# Patient Record
Sex: FEMALE | Race: ASIAN
[De-identification: names, ages, dates, MRNs, and addresses within clinical notes are randomized per-mention and may not be internally consistent; named-entity substitution may affect disease eponyms.]

---

## 2017-04-04 ENCOUNTER — HOSPITAL ENCOUNTER (OUTPATIENT)
Dept: HOSPITAL 45 - C.PAPS | Age: 52
Discharge: HOME | End: 2017-04-04
Attending: OBSTETRICS & GYNECOLOGY
Payer: COMMERCIAL

## 2017-04-04 DIAGNOSIS — Z01.419: Primary | ICD-10-CM

## 2017-06-19 ENCOUNTER — HOSPITAL ENCOUNTER (OUTPATIENT)
Dept: HOSPITAL 45 - C.LAB1850 | Age: 52
Discharge: HOME | End: 2017-06-19
Attending: INTERNAL MEDICINE
Payer: COMMERCIAL

## 2017-06-19 DIAGNOSIS — R61: ICD-10-CM

## 2017-06-19 DIAGNOSIS — Z11.59: ICD-10-CM

## 2017-06-19 DIAGNOSIS — Z13.220: ICD-10-CM

## 2017-06-19 DIAGNOSIS — L98.9: ICD-10-CM

## 2017-06-19 DIAGNOSIS — Z00.00: Primary | ICD-10-CM

## 2017-06-19 LAB
ALBUMIN/GLOB SERPL: 1 {RATIO} (ref 0.9–2)
ALP SERPL-CCNC: 56 U/L (ref 45–117)
ALT SERPL-CCNC: 25 U/L (ref 12–78)
ANION GAP SERPL CALC-SCNC: 8 MMOL/L (ref 3–11)
AST SERPL-CCNC: 20 U/L (ref 15–37)
BASOPHILS # BLD: 0.02 K/UL (ref 0–0.2)
BASOPHILS NFR BLD: 0.3 %
BUN SERPL-MCNC: 8 MG/DL (ref 7–18)
BUN/CREAT SERPL: 12.1 (ref 10–20)
CALCIUM SERPL-MCNC: 9.3 MG/DL (ref 8.5–10.1)
CHLORIDE SERPL-SCNC: 105 MMOL/L (ref 98–107)
CHOLEST/HDLC SERPL: 3.4 {RATIO}
CO2 SERPL-SCNC: 28 MMOL/L (ref 21–32)
COMPLETE: YES
CREAT SERPL-MCNC: 0.66 MG/DL (ref 0.6–1.2)
EOSINOPHIL NFR BLD AUTO: 363 K/UL (ref 130–400)
GLOBULIN SER-MCNC: 4.2 GM/DL (ref 2.5–4)
GLUCOSE SERPL-MCNC: 89 MG/DL (ref 70–99)
GLUCOSE UR QL: 58 MG/DL
HCT VFR BLD CALC: 38.9 % (ref 37–47)
IG%: 0.2 %
IMM GRANULOCYTES NFR BLD AUTO: 43.2 %
KETONES UR QL STRIP: 105 MG/DL
LYMPHOCYTES # BLD: 2.49 K/UL (ref 1.2–3.4)
MCH RBC QN AUTO: 31.9 PG (ref 25–34)
MCHC RBC AUTO-ENTMCNC: 33.2 G/DL (ref 32–36)
MCV RBC AUTO: 96.3 FL (ref 80–100)
MONOCYTES NFR BLD: 9.4 %
NEUTROPHILS # BLD AUTO: 4.2 %
NEUTROPHILS NFR BLD AUTO: 42.7 %
NITRITE UR QL STRIP: 168 MG/DL (ref 0–150)
PH UR: 197 MG/DL (ref 0–200)
PMV BLD AUTO: 9.5 FL (ref 7.4–10.4)
POTASSIUM SERPL-SCNC: 4 MMOL/L (ref 3.5–5.1)
RBC # BLD AUTO: 4.04 M/UL (ref 4.2–5.4)
SODIUM SERPL-SCNC: 141 MMOL/L (ref 136–145)
TSH SERPL-ACNC: 1.38 UIU/ML (ref 0.3–4.5)
VERY LOW DENSITY LIPOPROT CALC: 34 MG/DL
WBC # BLD AUTO: 5.77 K/UL (ref 4.8–10.8)

## 2017-08-10 ENCOUNTER — HOSPITAL ENCOUNTER (OUTPATIENT)
Dept: HOSPITAL 45 - C.MAMM | Age: 52
Discharge: HOME | End: 2017-08-10
Attending: FAMILY MEDICINE
Payer: COMMERCIAL

## 2017-08-10 DIAGNOSIS — Z12.31: Primary | ICD-10-CM

## 2017-08-11 NOTE — MAMMOGRAPHY REPORT
BILATERAL DIGITAL SCREENING MAMMOGRAM TOMOSYNTHESIS WITH CAD: 8/10/2017

CLINICAL HISTORY: Routine screening.  Patient has no complaints.  





TECHNIQUE:  Breast tomosynthesis in addition to standard 2D mammography was performed. Current study 
was also evaluated with a Computer Aided Detection (CAD) system.  



COMPARISON: Comparison is made to exams dated:  8/9/2016 mammogram, 5/13/2014 mammogram, and 4/23/201
3 mammogram - [facility].   



BREAST COMPOSITION:  The tissue of both breasts is extremely dense, which lowers the sensitivity of m
ammography.  



FINDINGS:  No suspicious masses, calcifications, or areas of architectural distortion are noted in ei
ther breast. There has been no significant interval change compared to prior exams.  



IMPRESSION:  ACR BI-RADS CATEGORY 1: NEGATIVE

There is no mammographic evidence of malignancy. A 1 year screening mammogram is recommended.  The pa
tient will receive written notification of the results.  





Approximately 10% of breast cancers are not detected with mammography. A negative mammographic report
 should not delay biopsy if a clinically suggestive mass is present.



Sparkle Larson M.D.          

ah/:8/10/2017 15:42:21  



Imaging Technologist: Shantel GOLD(R)(M), [facility]

letter sent: Normal 1/2  

BI-RADS Code: ACR BI-RADS Category 1: Negative

## 2018-04-11 ENCOUNTER — HOSPITAL ENCOUNTER (EMERGENCY)
Dept: HOSPITAL 45 - C.EDB | Age: 53
Discharge: HOME | End: 2018-04-11
Payer: COMMERCIAL

## 2018-04-11 VITALS
HEIGHT: 62.99 IN | BODY MASS INDEX: 21.48 KG/M2 | HEIGHT: 62.99 IN | WEIGHT: 121.25 LBS | WEIGHT: 121.25 LBS | BODY MASS INDEX: 21.48 KG/M2

## 2018-04-11 VITALS — TEMPERATURE: 97.88 F

## 2018-04-11 VITALS — DIASTOLIC BLOOD PRESSURE: 59 MMHG | HEART RATE: 62 BPM | SYSTOLIC BLOOD PRESSURE: 117 MMHG | OXYGEN SATURATION: 98 %

## 2018-04-11 DIAGNOSIS — R22.0: Primary | ICD-10-CM

## 2018-04-11 DIAGNOSIS — L53.9: ICD-10-CM

## 2018-04-11 LAB
ALBUMIN SERPL-MCNC: 3.8 GM/DL (ref 3.4–5)
ALP SERPL-CCNC: 62 U/L (ref 45–117)
ALT SERPL-CCNC: 22 U/L (ref 12–78)
AST SERPL-CCNC: 19 U/L (ref 15–37)
BASOPHILS # BLD: 0.04 K/UL (ref 0–0.2)
BASOPHILS NFR BLD: 0.7 %
BUN SERPL-MCNC: 9 MG/DL (ref 7–18)
CALCIUM SERPL-MCNC: 9 MG/DL (ref 8.5–10.1)
CO2 SERPL-SCNC: 29 MMOL/L (ref 21–32)
CREAT SERPL-MCNC: 0.6 MG/DL (ref 0.6–1.2)
EOS ABS #: 0.18 K/UL (ref 0–0.5)
EOSINOPHIL NFR BLD AUTO: 325 K/UL (ref 130–400)
GLUCOSE SERPL-MCNC: 89 MG/DL (ref 70–99)
HCT VFR BLD CALC: 37 % (ref 37–47)
HGB BLD-MCNC: 12.6 G/DL (ref 12–16)
IG#: 0.01 K/UL (ref 0–0.02)
IMM GRANULOCYTES NFR BLD AUTO: 39.9 %
LYMPHOCYTES # BLD: 2.32 K/UL (ref 1.2–3.4)
MCH RBC QN AUTO: 32.4 PG (ref 25–34)
MCHC RBC AUTO-ENTMCNC: 34.1 G/DL (ref 32–36)
MCV RBC AUTO: 95.1 FL (ref 80–100)
MONO ABS #: 0.47 K/UL (ref 0.11–0.59)
MONOCYTES NFR BLD: 8.1 %
NEUT ABS #: 2.79 K/UL (ref 1.4–6.5)
NEUTROPHILS # BLD AUTO: 3.1 %
NEUTROPHILS NFR BLD AUTO: 48 %
PMV BLD AUTO: 8.7 FL (ref 7.4–10.4)
POTASSIUM SERPL-SCNC: 3.5 MMOL/L (ref 3.5–5.1)
PROT SERPL-MCNC: 7.8 GM/DL (ref 6.4–8.2)
RED CELL DISTRIBUTION WIDTH CV: 13.5 % (ref 11.5–14.5)
RED CELL DISTRIBUTION WIDTH SD: 47 FL (ref 36.4–46.3)
SODIUM SERPL-SCNC: 140 MMOL/L (ref 136–145)
WBC # BLD AUTO: 5.81 K/UL (ref 4.8–10.8)

## 2018-04-11 NOTE — DIAGNOSTIC IMAGING REPORT
TWO VIEW CHEST



CLINICAL HISTORY: Dyspnea.



FINDINGS: PA and lateral chest radiographs are obtained. No prior studies are

available for comparison at the time of dictation.  The cardiomediastinal

silhouette is unremarkable.  The lungs and pleural spaces are clear. There is no

pneumothorax. The bony thorax appears intact.



IMPRESSION: No active disease in the chest.







Electronically signed by:  Yony Verma M.D.

4/11/2018 12:39 PM



Dictated Date/Time:  4/11/2018 12:38 PM

## 2018-04-12 NOTE — EMERGENCY ROOM VISIT NOTE
ED Visit Note


First contact with patient:  12:04


Chief Complaint: Facial swelling and difficulty breathing.





History of Present Illness: Ms. Edge is a 52-year-old  female who ambulates 

into the ED accompanied by her  complaining of facial redness, facial 

swelling and difficulty breathing.


Patient reports her symptoms started last evening approximately 14-15 hours 

ago.  She reports she initially noted some redness of her face and then 

swelling of her face.  She reports initially this was mild but has gradually 

increased.  And then through the night she reports she was experiencing 

difficulty breathing.  When I asked her to differentiate between shortness of 

breath and difficulty breathing she reported that it was difficulty breathing 

in that throughout the night she felt like her breathing was "heavy".  She has 

not identified any aggravating or alleviating factors related to these 

symptoms.  She has not taken any medications for these symptoms prior to 

arrival at the hospital.  She denies previous similar symptoms.


I did try to seek a cause and the only thing the patient remembers is that she 

used a face cream she had not used before a couple days ago.


Associated with her symptoms she does report that her face is itchy and that 

she had a mild intermittent nonproductive cough.


She denies fevers, chills, sweats, other skin eruptions, other skin color 

changes, other upper respiratory tract symptoms; nasal congestion, sinus 

congestion, lip swelling, tongue swelling, difficulty swallowing, voice changes

, drooling, painful talking, facial pain, chest pain, nausea/vomiting, 

decreased appetite.





Review of Systems: As noted above in history of present illness.  All body 

systems were reviewed and found to be negative as noted above.





Past Medical History: Patient denies.


Current Medications: Multivitamin.


Allergies to Medications: Patient denies.


Social History: Patient is currently employed; she lives with her  and 

feels safe in her home environment; she denies tobacco and alcohol use.





Physical Examination:


Vital Signs: 








  Date Time  Temp Pulse Resp B/P (MAP) Pulse Ox O2 Delivery O2 Flow Rate FiO2


 


4/11/18 14:37  62 14 117/59 98   


 


4/11/18 13:33  67 15  97 Room Air  


 


4/11/18 12:46  67      


 


4/11/18 11:46 36.6 68 16 147/88 96   





GENERAL: 52-year-old female in mild distress due to symptoms, nontoxic-appearing

, afebrile and hemodynamically stable.


NEUROLOGICAL: Awake, alert and oriented to person, place and time.  Answering 

questions appropriately and following commands.  Normal gait.  Good hand eye 

coordination.  No focal motor or sensory deficits.


SKIN: Warm, dry and pink.  Face: Patient had moderate erythema throughout the 

face associated with mild edema with prominence of the edema around the 

inferior eyes and nose bilaterally.  This area was warm to the touch but not 

hot.  It did not appear cellulitic.  There is no local lymphangitis.  The skin 

was not tender to palpation.


HEENT:  Atraumatic and normocephalic.  PERRLA.  Sclera white and conjunctiva 

pink.  No drainage from naris.  Oral cavity moist and pink.  Airway was patent.

  No lip or tongue swelling.  Pharynx is nonerythematous or edematous.  Speech 

normal and clear.  No lymphadenopathy.  Trachea midline.  No jugular venous 

distention.  No auditory or ausculatory stridor.


THORAX:  Lungs sounds are clear to auscultation but air movement was decreased 

bilaterally.  Equal bilaterally with symmetrical chest wall.  No wheezing, 

rales or rhonchi.  No crepitus, tenderness, subcutaneous air or deformities 

noted.  No increased respiratory effort or rate.


HEART:  Regular rate and rhythm.  No gallops, rubs or murmurs are appreciated.


ABDOMEN: Flat, soft and nontender.  Positive bowel sounds in all quadrants.  No 

guarding, rigidity or organomegaly.


EXTREMITIES:  Moves all extremities well on command and with purpose.  All 

distal neurovascular statuses are intact and equal bilaterally. 





ED Course:


Patient is assessed as noted above.


Patient's medication list was reviewed.


Laboratory Testing:








Test


  4/11/18


12:50 Range/Units


 


 


White Blood Count 5.81 4.8-10.8  K/uL


 


Red Blood Count 3.89 4.2-5.4  M/uL


 


Hemoglobin 12.6 12.0-16.0  g/dL


 


Hematocrit 37.0 37-47  %


 


Mean Corpuscular Volume 95.1   fL


 


Mean Corpuscular Hemoglobin 32.4 25-34  pg


 


Mean Corpuscular Hemoglobin


Concent 34.1


  32-36  g/dl


 


 


Platelet Count 325 130-400  K/uL


 


Mean Platelet Volume 8.7 7.4-10.4  fL


 


Neutrophils (%) (Auto) 48.0  %


 


Lymphocytes (%) (Auto) 39.9  %


 


Monocytes (%) (Auto) 8.1  %


 


Eosinophils (%) (Auto) 3.1  %


 


Basophils (%) (Auto) 0.7  %


 


Neutrophils # (Auto) 2.79 1.4-6.5  K/uL


 


Lymphocytes # (Auto) 2.32 1.2-3.4  K/uL


 


Monocytes # (Auto) 0.47 0.11-0.59  K/uL


 


Eosinophils # (Auto) 0.18 0-0.5  K/uL


 


Basophils # (Auto) 0.04 0-0.2  K/uL


 


RDW Standard Deviation 47.0 36.4-46.3  fL


 


RDW Coefficient of Variation 13.5 11.5-14.5  %


 


Immature Granulocyte % (Auto) 0.2  %


 


Immature Granulocyte # (Auto) 0.01 0.00-0.02  K/uL


 


Sodium Level 140 136-145  mmol/L


 


Potassium Level 3.5 3.5-5.1  mmol/L


 


Chloride Level 106   mmol/L


 


Carbon Dioxide Level 29 21-32  mmol/L


 


Anion Gap 6.0 3-11  mmol/L


 


Blood Urea Nitrogen 9 7-18  mg/dl


 


Creatinine


  0.60


  0.60-1.20


mg/dl


 


Est Creatinine Clear Calc


Drug Dose 88.6


   ml/min


 


 


Estimated GFR (


American) 119.8


   


 


 


Estimated GFR (Non-


American 103.3


   


 


 


BUN/Creatinine Ratio 14.9 10-20  


 


Random Glucose 89 70-99  mg/dl


 


Calcium Level 9.0 8.5-10.1  mg/dl


 


Total Bilirubin 0.9 0.2-1  mg/dl


 


Aspartate Amino Transf


(AST/SGOT) 19


  15-37  U/L


 


 


Alanine Aminotransferase


(ALT/SGPT) 22


  12-78  U/L


 


 


Alkaline Phosphatase 62   U/L


 


Total Protein 7.8 6.4-8.2  gm/dl


 


Albumin 3.8 3.4-5.0  gm/dl


 


Globulin 4.0 2.5-4.0  gm/dl


 


Albumin/Globulin Ratio 1.0 0.9-2  





Chest X-Ray: Was read by myself and the radiologist showing no acute infiltrates

, effusions or pneumothorax.  Normal heart silhouette and bony anatomy.


An IV lock was initiated and patient received 125 mg of Solu-Medrol IV and she 

received an albuterol/Atrovent nebulizer breathing treatment.


Patient was reassessed multiple times during her stay in the emergency 

department.  After her albuterol/Atrovent nebulizer breathing treatment her 

lungs were reevaluated and remained clear to auscultation but there was also 

increased air movement through all fields.  Additionally I did note on 

reassessments there was a significant decrease in her facial erythema and 

minimally decrease in the facial edema.


Patient's case was reviewed with Dr. Calderon; we agreed on diagnostic approach, 

treatment, disposition and plan.





Clinical Impression: Facial swelling and erythema.  Questionable allergic 

reaction.





Decision-Making: Initially my differential diagnosis I considered allergic 

reaction, dental abscess, sinus infection, angioedema, cellulitis and other 

causes.





Disposition: Patient discharged home in stable condition accompanied by her 

; prior to departure she was reassessed and subjectively reported she 

was feeling much better but still expressed concerns of her facial swelling.





Plan:


Patient was encouraged to use 25 mg of Benadryl every 6 hours as needed for 

facial itching.


Patient was prescribed a prednisone taper and instructed on its use.


Patient was encouraged to avoid the facial cream she was using.


Patient was encouraged to use ice on the face for swelling.


Patient was encouraged to follow-up with family physician for recheck in 2-3 

days if not completely better.


Patient was encouraged return the ED for worsening facial redness, worsening 

facial swelling, shortness of breath, sensations of lip/tongue swelling or any 

new/concerning symptoms.

## 2018-08-14 ENCOUNTER — HOSPITAL ENCOUNTER (OUTPATIENT)
Dept: HOSPITAL 45 - C.MAMM | Age: 53
Discharge: HOME | End: 2018-08-14
Attending: FAMILY MEDICINE
Payer: COMMERCIAL

## 2018-08-14 DIAGNOSIS — Z12.31: Primary | ICD-10-CM

## 2018-08-14 NOTE — MAMMOGRAPHY REPORT
BILATERAL DIGITAL SCREENING MAMMOGRAM TOMOSYNTHESIS WITH CAD: 8/14/2018

CLINICAL HISTORY: Routine screening.  





TECHNIQUE: The study was acquired using full field digital technology and interpreted from soft copy.
 Breast tomosynthesis in addition to standard 2D mammography was performed. Current study was also ev
aluated with a Computer Aided Detection (CAD) system.  



COMPARISON: Comparison is made to exams dated:  8/10/2017 mammogram, 8/9/2016 mammogram, 5/13/2014 ma
mmogram, and 4/23/2013 mammogram - [facility].   

BREAST COMPOSITION: The tissue of both breasts is extremely dense, which lowers the sensitivity of ma
mmography.  



FINDINGS: 

The parenchymal pattern is unchanged. No developing mass, architectural distortion or cluster of susp
icious microcalcifications is seen in either breast.  





IMPRESSION: ACR BI-RADS CATEGORY 2: BENIGN

There is no mammographic evidence of malignancy. A 1 year screening mammogram is recommended.(08/15/2
019)  The patient will receive written notification of the results.  





Some breast cancers are not detected with mammography. A negative mammographic report should not emmanuel
y biopsy if a clinically suggestive mass is present.



Stacey Smith M.D.          

ay/:8/14/2018 10:00:56  



Imaging Technologist: RT French(R)(M), [facility]

letter sent: Normal 1/2  

BI-RADS Code: ACR BI-RADS Category 2: Benign